# Patient Record
Sex: FEMALE | Race: WHITE | NOT HISPANIC OR LATINO | Employment: OTHER | URBAN - NONMETROPOLITAN AREA
[De-identification: names, ages, dates, MRNs, and addresses within clinical notes are randomized per-mention and may not be internally consistent; named-entity substitution may affect disease eponyms.]

---

## 2020-09-02 ENCOUNTER — HOSPITAL ENCOUNTER (OUTPATIENT)
Dept: GENERAL RADIOLOGY | Facility: HOSPITAL | Age: 75
Discharge: HOME | End: 2020-09-02
Admitting: FAMILY MEDICINE

## 2020-09-02 ENCOUNTER — TRANSCRIBE ORDERS (OUTPATIENT)
Dept: LAB | Facility: HOSPITAL | Age: 75
End: 2020-09-02

## 2020-09-02 DIAGNOSIS — M54.40 LOW BACK PAIN WITH SCIATICA, SCIATICA LATERALITY UNSPECIFIED, UNSPECIFIED BACK PAIN LATERALITY, UNSPECIFIED CHRONICITY: ICD-10-CM

## 2020-09-02 DIAGNOSIS — M54.40 LOW BACK PAIN WITH SCIATICA, SCIATICA LATERALITY UNSPECIFIED, UNSPECIFIED BACK PAIN LATERALITY, UNSPECIFIED CHRONICITY: Primary | ICD-10-CM

## 2020-09-02 PROCEDURE — 72110 X-RAY EXAM L-2 SPINE 4/>VWS: CPT | Performed by: RADIOLOGY

## 2020-09-02 PROCEDURE — 72110 X-RAY EXAM L-2 SPINE 4/>VWS: CPT

## 2020-10-23 ENCOUNTER — HOSPITAL ENCOUNTER (OUTPATIENT)
Dept: GENERAL RADIOLOGY | Facility: HOSPITAL | Age: 75
Discharge: HOME | End: 2020-10-23
Admitting: FAMILY MEDICINE

## 2020-10-23 DIAGNOSIS — R06.00 DYSPNEA: ICD-10-CM

## 2020-10-23 PROCEDURE — 71046 X-RAY EXAM CHEST 2 VIEWS: CPT | Performed by: RADIOLOGY

## 2020-10-23 PROCEDURE — 71046 X-RAY EXAM CHEST 2 VIEWS: CPT

## 2021-01-25 ENCOUNTER — IMMUNIZATION (OUTPATIENT)
Dept: VACCINE CLINIC | Facility: HOSPITAL | Age: 76
End: 2021-01-25

## 2021-01-25 PROCEDURE — 0001A: CPT | Performed by: FAMILY MEDICINE

## 2021-01-25 PROCEDURE — 91300 HC SARSCOV02 VAC 30MCG/0.3ML IM: CPT | Performed by: FAMILY MEDICINE

## 2021-02-15 ENCOUNTER — APPOINTMENT (OUTPATIENT)
Dept: VACCINE CLINIC | Facility: HOSPITAL | Age: 76
End: 2021-02-15

## 2021-02-19 ENCOUNTER — IMMUNIZATION (OUTPATIENT)
Dept: VACCINE CLINIC | Facility: HOSPITAL | Age: 76
End: 2021-02-19

## 2021-02-19 PROCEDURE — 0002A: CPT | Performed by: INTERNAL MEDICINE

## 2021-02-19 PROCEDURE — 91300 HC SARSCOV02 VAC 30MCG/0.3ML IM: CPT | Performed by: INTERNAL MEDICINE

## 2021-05-11 ENCOUNTER — HOSPITAL ENCOUNTER (OUTPATIENT)
Dept: GENERAL RADIOLOGY | Facility: HOSPITAL | Age: 76
Discharge: HOME | End: 2021-05-11
Admitting: NURSE PRACTITIONER

## 2021-05-11 ENCOUNTER — TRANSCRIBE ORDERS (OUTPATIENT)
Dept: GENERAL RADIOLOGY | Facility: HOSPITAL | Age: 76
End: 2021-05-11

## 2021-05-11 DIAGNOSIS — R05.9 COUGH: Primary | ICD-10-CM

## 2021-05-11 DIAGNOSIS — R05.9 COUGH: ICD-10-CM

## 2021-05-11 PROCEDURE — 71046 X-RAY EXAM CHEST 2 VIEWS: CPT

## 2021-05-11 PROCEDURE — 71046 X-RAY EXAM CHEST 2 VIEWS: CPT | Performed by: RADIOLOGY

## 2021-07-15 ENCOUNTER — HOSPITAL ENCOUNTER (OUTPATIENT)
Dept: GENERAL RADIOLOGY | Facility: HOSPITAL | Age: 76
Discharge: HOME | End: 2021-07-15
Admitting: FAMILY MEDICINE

## 2021-07-15 ENCOUNTER — TRANSCRIBE ORDERS (OUTPATIENT)
Dept: LAB | Facility: HOSPITAL | Age: 76
End: 2021-07-15

## 2021-07-15 DIAGNOSIS — J40 BRONCHITIS: ICD-10-CM

## 2021-07-15 DIAGNOSIS — J40 BRONCHITIS: Primary | ICD-10-CM

## 2021-07-15 PROCEDURE — 71046 X-RAY EXAM CHEST 2 VIEWS: CPT | Performed by: RADIOLOGY

## 2021-07-15 PROCEDURE — 71046 X-RAY EXAM CHEST 2 VIEWS: CPT

## 2022-11-01 ENCOUNTER — HOSPITAL ENCOUNTER (OUTPATIENT)
Dept: GENERAL RADIOLOGY | Facility: HOSPITAL | Age: 77
Discharge: HOME | End: 2022-11-01
Admitting: FAMILY MEDICINE

## 2022-11-01 ENCOUNTER — TRANSCRIBE ORDERS (OUTPATIENT)
Dept: LAB | Facility: HOSPITAL | Age: 77
End: 2022-11-01

## 2022-11-01 DIAGNOSIS — J40 BRONCHITIS, NOT SPECIFIED AS ACUTE OR CHRONIC: Primary | ICD-10-CM

## 2022-11-01 DIAGNOSIS — J40 BRONCHITIS, NOT SPECIFIED AS ACUTE OR CHRONIC: ICD-10-CM

## 2022-11-01 PROCEDURE — 71046 X-RAY EXAM CHEST 2 VIEWS: CPT | Performed by: RADIOLOGY

## 2022-11-01 PROCEDURE — 71046 X-RAY EXAM CHEST 2 VIEWS: CPT

## 2023-04-27 ENCOUNTER — TRANSCRIBE ORDERS (OUTPATIENT)
Dept: LAB | Facility: HOSPITAL | Age: 78
End: 2023-04-27
Payer: MEDICARE

## 2023-04-27 ENCOUNTER — HOSPITAL ENCOUNTER (OUTPATIENT)
Dept: GENERAL RADIOLOGY | Facility: HOSPITAL | Age: 78
Discharge: HOME | End: 2023-04-27
Payer: MEDICARE

## 2023-04-27 DIAGNOSIS — M47.9 OSTEOARTHRITIS OF SPINE, UNSPECIFIED SPINAL OSTEOARTHRITIS COMPLICATION STATUS, UNSPECIFIED SPINAL REGION: ICD-10-CM

## 2023-04-27 DIAGNOSIS — M47.9 OSTEOARTHRITIS OF SPINE, UNSPECIFIED SPINAL OSTEOARTHRITIS COMPLICATION STATUS, UNSPECIFIED SPINAL REGION: Primary | ICD-10-CM

## 2023-04-27 PROCEDURE — 72100 X-RAY EXAM L-S SPINE 2/3 VWS: CPT | Performed by: RADIOLOGY

## 2023-04-27 PROCEDURE — 72100 X-RAY EXAM L-S SPINE 2/3 VWS: CPT

## 2024-03-05 ENCOUNTER — HOSPITAL ENCOUNTER (OUTPATIENT)
Dept: GENERAL RADIOLOGY | Facility: HOSPITAL | Age: 79
Discharge: HOME | End: 2024-03-05
Admitting: FAMILY MEDICINE
Payer: MEDICARE

## 2024-03-05 DIAGNOSIS — J45.21 MILD INTERMITTENT ASTHMA WITH EXACERBATION: ICD-10-CM

## 2024-03-05 PROCEDURE — 71046 X-RAY EXAM CHEST 2 VIEWS: CPT

## 2024-03-05 PROCEDURE — 71046 X-RAY EXAM CHEST 2 VIEWS: CPT | Performed by: RADIOLOGY

## 2024-04-23 ENCOUNTER — OFFICE VISIT (OUTPATIENT)
Dept: CARDIOLOGY | Facility: CLINIC | Age: 79
End: 2024-04-23
Payer: MEDICARE

## 2024-04-23 VITALS
HEIGHT: 62 IN | DIASTOLIC BLOOD PRESSURE: 70 MMHG | OXYGEN SATURATION: 97 % | BODY MASS INDEX: 31.87 KG/M2 | WEIGHT: 173.2 LBS | HEART RATE: 70 BPM | SYSTOLIC BLOOD PRESSURE: 146 MMHG

## 2024-04-23 DIAGNOSIS — R93.1 ABNORMAL CARDIAC CT ANGIOGRAPHY: ICD-10-CM

## 2024-04-23 DIAGNOSIS — R93.1 ABNORMAL CARDIAC CT ANGIOGRAPHY: Primary | ICD-10-CM

## 2024-04-23 DIAGNOSIS — R07.9 CHRONIC CHEST PAIN WITH HIGH RISK FOR CAD: Primary | ICD-10-CM

## 2024-04-23 DIAGNOSIS — I10 ESSENTIAL HYPERTENSION: ICD-10-CM

## 2024-04-23 DIAGNOSIS — Z91.89 CHRONIC CHEST PAIN WITH HIGH RISK FOR CAD: Primary | ICD-10-CM

## 2024-04-23 DIAGNOSIS — G89.29 CHRONIC CHEST PAIN WITH HIGH RISK FOR CAD: Primary | ICD-10-CM

## 2024-04-23 PROCEDURE — 93000 ELECTROCARDIOGRAM COMPLETE: CPT | Performed by: INTERNAL MEDICINE

## 2024-04-23 PROCEDURE — 3077F SYST BP >= 140 MM HG: CPT | Performed by: INTERNAL MEDICINE

## 2024-04-23 PROCEDURE — 99204 OFFICE O/P NEW MOD 45 MIN: CPT | Performed by: INTERNAL MEDICINE

## 2024-04-23 PROCEDURE — 3078F DIAST BP <80 MM HG: CPT | Performed by: INTERNAL MEDICINE

## 2024-04-23 RX ORDER — HYDROCODONE BITARTRATE AND ACETAMINOPHEN 5; 325 MG/1; MG/1
1 TABLET ORAL 3 TIMES DAILY
COMMUNITY
Start: 2024-04-05 | End: 2024-06-04

## 2024-04-23 RX ORDER — HYDROCHLOROTHIAZIDE 12.5 MG/1
12.5 CAPSULE, GELATIN COATED ORAL DAILY
Qty: 90 CAPSULE | Refills: 3 | Status: SHIPPED | OUTPATIENT
Start: 2024-04-23

## 2024-04-23 RX ORDER — ALPRAZOLAM 0.5 MG/1
0.5 TABLET ORAL NIGHTLY PRN
COMMUNITY
Start: 2024-04-05 | End: 2024-10-02

## 2024-04-23 RX ORDER — LEVOTHYROXINE SODIUM 0.1 MG/1
100 TABLET ORAL DAILY
COMMUNITY
Start: 2023-08-16 | End: 2024-08-15

## 2024-04-23 RX ORDER — LOSARTAN POTASSIUM 100 MG/1
100 TABLET ORAL DAILY
COMMUNITY
Start: 2024-02-13

## 2024-04-23 NOTE — H&P (VIEW-ONLY)
Subjective     Chief Complaint   Patient presents with    Establish Care     ?abn CT heart    Hypertension     today    Chest Pain       History of Present Illness    Martha is a 78 years old white female patient of Dr. Plascencia.  She states that she had been having aching in her left arm and left anterior chest pain and substernal fullness.  She was sent to [facility] for CT heart coronary with IV contrast.  Patient was told that her calcium score was very high and she had significant coronary artery disease.  She was apparently scheduled for coronary angiography in [place] however she was sent here for second opinion.    Chest pain is moderate in intensity it has atypical features along with atypical features it is not particular related to exertion however she gets left arm pain with exertion and gets short of breath.    There is no prior history of coronary artery disease    Patient quit smoking around 20 years ago used to smoke 1 pack of cigarettes daily  Hypertension is not very well-controlled she is taking Coreg 12.5 twice daily and losartan 100 mg daily.  She does not wish to increase coreg.    Hypothyroidism on Synthroid replacement therapy.    Past Surgical History:   Procedure Laterality Date    COLONOSCOPY      HYSTERECTOMY       Family History   Problem Relation Age of Onset    No Known Problems Mother     No Known Problems Father      Past Medical History:   Diagnosis Date    Hypertension     Psoriasis        Patient Active Problem List   Diagnosis    Psoriasis    Tubular adenoma of colon    Heartburn    Precordial pain    Essential hypertension    SOB (shortness of breath)    Heart palpitations    Chronic chest pain with high risk for CAD    Abnormal cardiac CT angiography         Social History     Tobacco Use    Smoking status: Former     Current packs/day: 0.00     Types: Cigarettes     Quit date: 10/27/2001     Years since quittin.5    Smokeless tobacco: Never   Substance Use Topics  "   Alcohol use: No    Drug use: No         The following portions of the patient's history were reviewed and updated as appropriate: allergies, current medications, past family history, past medical history, past social history, past surgical history and problem list.    No Known Allergies      Current Outpatient Medications:     ALPRAZolam (XANAX) 0.5 MG tablet, Take 1 tablet by mouth At Night As Needed., Disp: , Rfl:     carvedilol (COREG) 12.5 MG tablet, Take 1 tablet by mouth 2 (Two) Times a Day., Disp: 60 tablet, Rfl: 6    HYDROcodone-acetaminophen (NORCO) 5-325 MG per tablet, Take 1 tablet by mouth 3 (Three) Times a Day., Disp: , Rfl:     levothyroxine (SYNTHROID, LEVOTHROID) 100 MCG tablet, Take 1 tablet by mouth Daily., Disp: , Rfl:     losartan (COZAAR) 100 MG tablet, Take 1 tablet by mouth Daily., Disp: , Rfl:     hydroCHLOROthiazide (MICROZIDE) 12.5 MG capsule, Take 1 capsule by mouth Daily., Disp: 90 capsule, Rfl: 3    Review of Systems   Constitutional: Negative.   HENT: Negative.  Negative for congestion.    Eyes: Negative.    Cardiovascular:  Positive for chest pain and dyspnea on exertion. Negative for cyanosis, irregular heartbeat, leg swelling, near-syncope, orthopnea, palpitations, paroxysmal nocturnal dyspnea and syncope.   Respiratory:  Positive for shortness of breath.    Hematologic/Lymphatic: Negative.    Musculoskeletal: Negative.    Gastrointestinal: Negative.    Neurological: Negative.  Negative for headaches.        Objective      /70 (BP Location: Left arm, Patient Position: Sitting, Cuff Size: Adult)   Pulse 70   Ht 157.5 cm (62\")   Wt 78.6 kg (173 lb 3.2 oz)   SpO2 97%   BMI 31.68 kg/m²     Pulmonary:      Effort: Pulmonary effort is normal.      Breath sounds: Normal breath sounds. No stridor. No wheezing. No rhonchi. No rales.   Cardiovascular:      PMI at left midclavicular line. Normal rate. Regular rhythm. Normal S1. Normal S2.       Murmurs: There is no murmur.      No " gallop.  No click. No rub.   Pulses:     Intact distal pulses.   Edema:     Peripheral edema absent.         Lab Review:    CBC Auto Diff, Reflex Manual Diff if Indicated    Component  Ref Range & Units 1 mo ago   WBC  4.12 - 11.13 10*3/uL 7.03   RBC  4.07 - 4.92 10*6/uL 3.84 Low    Hemoglobin  12.0 - 14.1 g/dL 10.9 Low    Hematocrit  33.5 - 43.3 % 35.6   MCV  77.2 - 97.5 fL 92.7   MCH  26.6 - 31.8 pg 28.4   MCHC  31.8 - 34.9 g/dL 30.6 Low    RDW  11.6 - 14.7 % 14.4   Platelet Count  150 - 350 10*3/uL 187   MPV  8.8 - 11.8 fL 11.1   Neutrophils %  32.3 - 66.7 % 66.5   Lymphocytes %  21.2 - 49.6 % 23.3   Monocytes %  2.0 - 12.0 % 5.3   Eosinophils %  1.0 - 6.0 % 3.8   Basophils %  0.1 - 1.1 % 0.7   Neutrophils Absolute  1.30 - 7.00 10*3/uL 4.67   Lymphocytes Absolute  0.90 - 5.50 10*3/uL 1.64   Monocytes Absolute  0.10 - 1.30 10*3/uL 0.37   Eosinophils Absolute  0.00 - 1.00 10*3/uL 0.27   Basophil Absolute  0.00 - 0.10 10*3/uL 0.05   Immature Granulocytes %  0.0 - 1.0 % 0.4   Immature Granulocytes Absolute   0.03       Contains abnormal data Comprehensive Metabolic Panel (CMP)  Order: [id number]  Component  Ref Range & Units 1 mo ago   Sodium  136 - 145 mmol/L 140   Potassium  3.5 - 5.1 mmol/L 4.6   Chloride  98 - 107 mmol/L 105   Carbon Dioxide  22.0 - 29.0 mmol/L 26.1   Anion Gap  5 - 15 mmol/L 9   BUN  8.0 - 23.0 mg/dL 19.0   Creatinine  0.50 - 0.90 mg/dL 0.83   BUN/Creatinine Ratio 22.9   Glucose  70 - 105 mg/dL 96   Calcium  8.8 - 10.2 mg/dL 8.8   AST   18   ALT   8   Alkaline Phosphatase  35 - 104 U/L 69   Protein, Total  6.6 - 8.7 g/dL 7.1   Albumin  3.50 - 5.20 g/dL 4.10   Globulin  2.3 - 3.5 g/dL 3.0   A/G Ratio  1.1 - 2.1 1.4   Bilirubin, Total   0.47   Osmolality Calc  275 - 295 mosm/kg 273 Low    eGFR   73.2   Comment: GFR calculated based on CKD-EPI 2021 C     Lipid Panel  Order: [id number]  Component  Ref Range & Units 1 mo ago   Triglycerides   89   Cholesterol, Total   166.00      HDL Cholesterol  45.00  - 65.00 mg/dL 55.30      LDL Cholesterol, Calc   92.9   Chol/HDL Ratio 3.0   LDL/HDL Ratio 1.7   VLDL, Calculated  mg/dL 18   Non-HDL Cholesterol  mg/dL 111         ECG 12 Lead    Date/Time: 4/23/2024 3:26 PM  Performed by: Concha Burris MD    Authorized by: Concha Burris MD  Comparison: compared with previous ECG from 10/27/2016  Comparison to previous ECG: Nonspecific ST changes are more pronounced today  Rhythm: sinus rhythm  Rate: normal  BPM: 70  Conduction: conduction normal  QRS axis: normal  Other findings: non-specific ST-T wave changes    Clinical impression: abnormal EKG              I reviewed the patient's new clinical results.  I personally viewed and interpreted the patient's EKG/lab data        Assessment:   Diagnosis Plan   1. Chronic chest pain with high risk for CAD  ECG 12 Lead      2. Abnormal cardiac CT angiography        3. Essential hypertension               Plan:  Martha is a 78 years old white female who is here for cardiac evaluation because of chest pain and left arm pain and abnormal cardiac CT angiogram.    Blood pressure is also elevated.  She was advised to Coreg 12.5 twice daily, hydrochlorothiazide 12.5 daily and losartan 100 mg daily.    CT angiogram was reviewed and discussed with the patient  Patient has right dominant system, left main estimated stenosis less than 25%  LAD dense calcific plaque in the proximal and mid segment maximal stenosis estimated 50 to 69%  Circumflex without definitive high-grade stenosis  Right coronary artery estimated maximal stenosis at least 50 to 69%.  Very high calcium score    Options were discussed with the patient  She wants to pursue coronary angiogram which will be arranged    Thank you for giving me the oppertunity to participate in your patient's cardiac care.    Sincerely,    BEATRIS Burris M.D. FACP FACC     No follow-ups on file.

## 2024-04-23 NOTE — PROGRESS NOTES
Subjective     Chief Complaint   Patient presents with    Establish Care     ?abn CT heart    Hypertension     today    Chest Pain       History of Present Illness    Martha is a 78 years old white female patient of Dr. Plascencia.  She states that she had been having aching in her left arm and left anterior chest pain and substernal fullness.  She was sent to [facility] for CT heart coronary with IV contrast.  Patient was told that her calcium score was very high and she had significant coronary artery disease.  She was apparently scheduled for coronary angiography in [place] however she was sent here for second opinion.    Chest pain is moderate in intensity it has atypical features along with atypical features it is not particular related to exertion however she gets left arm pain with exertion and gets short of breath.    There is no prior history of coronary artery disease    Patient quit smoking around 20 years ago used to smoke 1 pack of cigarettes daily  Hypertension is not very well-controlled she is taking Coreg 12.5 twice daily and losartan 100 mg daily.  She does not wish to increase coreg.    Hypothyroidism on Synthroid replacement therapy.    Past Surgical History:   Procedure Laterality Date    COLONOSCOPY      HYSTERECTOMY       Family History   Problem Relation Age of Onset    No Known Problems Mother     No Known Problems Father      Past Medical History:   Diagnosis Date    Hypertension     Psoriasis        Patient Active Problem List   Diagnosis    Psoriasis    Tubular adenoma of colon    Heartburn    Precordial pain    Essential hypertension    SOB (shortness of breath)    Heart palpitations    Chronic chest pain with high risk for CAD    Abnormal cardiac CT angiography         Social History     Tobacco Use    Smoking status: Former     Current packs/day: 0.00     Types: Cigarettes     Quit date: 10/27/2001     Years since quittin.5    Smokeless tobacco: Never   Substance Use Topics  "   Alcohol use: No    Drug use: No         The following portions of the patient's history were reviewed and updated as appropriate: allergies, current medications, past family history, past medical history, past social history, past surgical history and problem list.    No Known Allergies      Current Outpatient Medications:     ALPRAZolam (XANAX) 0.5 MG tablet, Take 1 tablet by mouth At Night As Needed., Disp: , Rfl:     carvedilol (COREG) 12.5 MG tablet, Take 1 tablet by mouth 2 (Two) Times a Day., Disp: 60 tablet, Rfl: 6    HYDROcodone-acetaminophen (NORCO) 5-325 MG per tablet, Take 1 tablet by mouth 3 (Three) Times a Day., Disp: , Rfl:     levothyroxine (SYNTHROID, LEVOTHROID) 100 MCG tablet, Take 1 tablet by mouth Daily., Disp: , Rfl:     losartan (COZAAR) 100 MG tablet, Take 1 tablet by mouth Daily., Disp: , Rfl:     hydroCHLOROthiazide (MICROZIDE) 12.5 MG capsule, Take 1 capsule by mouth Daily., Disp: 90 capsule, Rfl: 3    Review of Systems   Constitutional: Negative.   HENT: Negative.  Negative for congestion.    Eyes: Negative.    Cardiovascular:  Positive for chest pain and dyspnea on exertion. Negative for cyanosis, irregular heartbeat, leg swelling, near-syncope, orthopnea, palpitations, paroxysmal nocturnal dyspnea and syncope.   Respiratory:  Positive for shortness of breath.    Hematologic/Lymphatic: Negative.    Musculoskeletal: Negative.    Gastrointestinal: Negative.    Neurological: Negative.  Negative for headaches.        Objective      /70 (BP Location: Left arm, Patient Position: Sitting, Cuff Size: Adult)   Pulse 70   Ht 157.5 cm (62\")   Wt 78.6 kg (173 lb 3.2 oz)   SpO2 97%   BMI 31.68 kg/m²     Pulmonary:      Effort: Pulmonary effort is normal.      Breath sounds: Normal breath sounds. No stridor. No wheezing. No rhonchi. No rales.   Cardiovascular:      PMI at left midclavicular line. Normal rate. Regular rhythm. Normal S1. Normal S2.       Murmurs: There is no murmur.      No " gallop.  No click. No rub.   Pulses:     Intact distal pulses.   Edema:     Peripheral edema absent.         Lab Review:    CBC Auto Diff, Reflex Manual Diff if Indicated    Component  Ref Range & Units 1 mo ago   WBC  4.12 - 11.13 10*3/uL 7.03   RBC  4.07 - 4.92 10*6/uL 3.84 Low    Hemoglobin  12.0 - 14.1 g/dL 10.9 Low    Hematocrit  33.5 - 43.3 % 35.6   MCV  77.2 - 97.5 fL 92.7   MCH  26.6 - 31.8 pg 28.4   MCHC  31.8 - 34.9 g/dL 30.6 Low    RDW  11.6 - 14.7 % 14.4   Platelet Count  150 - 350 10*3/uL 187   MPV  8.8 - 11.8 fL 11.1   Neutrophils %  32.3 - 66.7 % 66.5   Lymphocytes %  21.2 - 49.6 % 23.3   Monocytes %  2.0 - 12.0 % 5.3   Eosinophils %  1.0 - 6.0 % 3.8   Basophils %  0.1 - 1.1 % 0.7   Neutrophils Absolute  1.30 - 7.00 10*3/uL 4.67   Lymphocytes Absolute  0.90 - 5.50 10*3/uL 1.64   Monocytes Absolute  0.10 - 1.30 10*3/uL 0.37   Eosinophils Absolute  0.00 - 1.00 10*3/uL 0.27   Basophil Absolute  0.00 - 0.10 10*3/uL 0.05   Immature Granulocytes %  0.0 - 1.0 % 0.4   Immature Granulocytes Absolute   0.03       Contains abnormal data Comprehensive Metabolic Panel (CMP)  Order: [id number]  Component  Ref Range & Units 1 mo ago   Sodium  136 - 145 mmol/L 140   Potassium  3.5 - 5.1 mmol/L 4.6   Chloride  98 - 107 mmol/L 105   Carbon Dioxide  22.0 - 29.0 mmol/L 26.1   Anion Gap  5 - 15 mmol/L 9   BUN  8.0 - 23.0 mg/dL 19.0   Creatinine  0.50 - 0.90 mg/dL 0.83   BUN/Creatinine Ratio 22.9   Glucose  70 - 105 mg/dL 96   Calcium  8.8 - 10.2 mg/dL 8.8   AST   18   ALT   8   Alkaline Phosphatase  35 - 104 U/L 69   Protein, Total  6.6 - 8.7 g/dL 7.1   Albumin  3.50 - 5.20 g/dL 4.10   Globulin  2.3 - 3.5 g/dL 3.0   A/G Ratio  1.1 - 2.1 1.4   Bilirubin, Total   0.47   Osmolality Calc  275 - 295 mosm/kg 273 Low    eGFR   73.2   Comment: GFR calculated based on CKD-EPI 2021 C     Lipid Panel  Order: [id number]  Component  Ref Range & Units 1 mo ago   Triglycerides   89   Cholesterol, Total   166.00      HDL Cholesterol  45.00  - 65.00 mg/dL 55.30      LDL Cholesterol, Calc   92.9   Chol/HDL Ratio 3.0   LDL/HDL Ratio 1.7   VLDL, Calculated  mg/dL 18   Non-HDL Cholesterol  mg/dL 111         ECG 12 Lead    Date/Time: 4/23/2024 3:26 PM  Performed by: Concha Burris MD    Authorized by: Concha Burris MD  Comparison: compared with previous ECG from 10/27/2016  Comparison to previous ECG: Nonspecific ST changes are more pronounced today  Rhythm: sinus rhythm  Rate: normal  BPM: 70  Conduction: conduction normal  QRS axis: normal  Other findings: non-specific ST-T wave changes    Clinical impression: abnormal EKG              I reviewed the patient's new clinical results.  I personally viewed and interpreted the patient's EKG/lab data        Assessment:   Diagnosis Plan   1. Chronic chest pain with high risk for CAD  ECG 12 Lead      2. Abnormal cardiac CT angiography        3. Essential hypertension               Plan:  Martha is a 78 years old white female who is here for cardiac evaluation because of chest pain and left arm pain and abnormal cardiac CT angiogram.    Blood pressure is also elevated.  She was advised to Coreg 12.5 twice daily, hydrochlorothiazide 12.5 daily and losartan 100 mg daily.    CT angiogram was reviewed and discussed with the patient  Patient has right dominant system, left main estimated stenosis less than 25%  LAD dense calcific plaque in the proximal and mid segment maximal stenosis estimated 50 to 69%  Circumflex without definitive high-grade stenosis  Right coronary artery estimated maximal stenosis at least 50 to 69%.  Very high calcium score    Options were discussed with the patient  She wants to pursue coronary angiogram which will be arranged    Thank you for giving me the oppertunity to participate in your patient's cardiac care.    Sincerely,    BEATRIS Burris M.D. FACP FACC     No follow-ups on file.

## 2024-04-23 NOTE — LETTER
April 23, 2024     Nafisa Plascencia MD  [address] [place], [place] [id number]    Patient: Martha Rod   YOB: 1945   Date of Visit: 4/23/2024       Dear Nafisa Plascencia MD,    Martha Rod was in my office today. Below are the relevant portions of my assessment and plan of care.           If you have questions, please do not hesitate to call me. I look forward to following Martha along with you.         Sincerely,        Concha Burris MD        CC: No Recipients

## 2024-04-23 NOTE — LETTER
April 23, 2024     Nafisa Plascencia MD  [address] [place], [place] [id number]    Patient: Martha Rod   YOB: 1945   Date of Visit: 4/23/2024       Dear Nafisa Plascencia MD    Martha Rod was in my office today. Below is a copy of my note.    If you have questions, please do not hesitate to call me. I look forward to following Martha along with you.         Sincerely,        Concha Burris MD        CC: No Recipients    No notes on file

## 2024-04-25 ENCOUNTER — PATIENT ROUNDING (BHMG ONLY) (OUTPATIENT)
Dept: CARDIOLOGY | Facility: CLINIC | Age: 79
End: 2024-04-25
Payer: MEDICARE

## 2024-04-25 NOTE — PROGRESS NOTES
Migel. My name is Lu. I am a CMA at [facility] [place] [facility]      I want to officially welcome you to our practice and ask about your recent visit.         What things do you feel went well with your visit?        Do you have recommendations on ways we may improve?        Overall were you satisfied with your first visit to our practice?        I appreciate you taking the time to answer a few questions today.         We're always looking for ways to make our patients' experiences even better. Please complete the [facility] Survey after your visits. We would love to receive feedback about your visits. You may also share any suggestions or concerns by replying to this message or calling our office.         Thank you for allowing us to participate in your healthcare. Please let me know if I can be of further assistance.                Kind regards,      Lu

## 2024-05-01 DIAGNOSIS — R93.1 ABNORMAL CARDIAC CT ANGIOGRAPHY: Primary | ICD-10-CM

## 2024-05-03 ENCOUNTER — TRANSCRIBE ORDERS (OUTPATIENT)
Dept: CARDIAC REHAB | Facility: HOSPITAL | Age: 79
End: 2024-05-03
Payer: MEDICARE

## 2024-05-03 ENCOUNTER — HOSPITAL ENCOUNTER (OUTPATIENT)
Facility: HOSPITAL | Age: 79
Discharge: HOME | End: 2024-05-04
Attending: INTERNAL MEDICINE | Admitting: INTERNAL MEDICINE
Payer: MEDICARE

## 2024-05-03 DIAGNOSIS — Z91.89 CHRONIC CHEST PAIN WITH HIGH RISK FOR CAD: Primary | ICD-10-CM

## 2024-05-03 DIAGNOSIS — G89.29 CHRONIC CHEST PAIN WITH HIGH RISK FOR CAD: Primary | ICD-10-CM

## 2024-05-03 DIAGNOSIS — R07.9 CHRONIC CHEST PAIN WITH HIGH RISK FOR CAD: Primary | ICD-10-CM

## 2024-05-03 DIAGNOSIS — Z95.5 STENTED CORONARY ARTERY: Primary | ICD-10-CM

## 2024-05-03 DIAGNOSIS — R93.1 ABNORMAL CARDIAC CT ANGIOGRAPHY: ICD-10-CM

## 2024-05-03 PROBLEM — I25.10 CAD (CORONARY ARTERY DISEASE): Status: ACTIVE | Noted: 2024-05-03

## 2024-05-03 LAB
ACT BLD: 212 SECONDS (ref 82–152)
ACT BLD: 228 SECONDS (ref 82–152)
ALBUMIN SERPL-MCNC: 4.3 G/DL (ref 3.5–5.2)
ALBUMIN/GLOB SERPL: 1.9 G/DL
ALP SERPL-CCNC: 72 U/L (ref 39–117)
ALT SERPL W P-5'-P-CCNC: 7 U/L (ref 1–33)
ANION GAP SERPL CALCULATED.3IONS-SCNC: 7 MMOL/L (ref 5–15)
AST SERPL-CCNC: 11 U/L (ref 1–32)
BILIRUB SERPL-MCNC: 0.6 MG/DL (ref 0–1.2)
BUN SERPL-MCNC: 16 MG/DL (ref 8–23)
BUN/CREAT SERPL: 18.2 (ref 7–25)
CALCIUM SPEC-SCNC: 9.5 MG/DL (ref 8.6–10.5)
CATH EF ESTIMATED: 70 %
CHLORIDE SERPL-SCNC: 103 MMOL/L (ref 98–107)
CO2 SERPL-SCNC: 29 MMOL/L (ref 22–29)
CREAT SERPL-MCNC: 0.88 MG/DL (ref 0.57–1)
DEPRECATED RDW RBC AUTO: 49.1 FL (ref 37–54)
EGFRCR SERPLBLD CKD-EPI 2021: 68.2 ML/MIN/1.73
ERYTHROCYTE [DISTWIDTH] IN BLOOD BY AUTOMATED COUNT: 14.5 % (ref 12.3–15.4)
GLOBULIN UR ELPH-MCNC: 2.3 GM/DL
GLUCOSE SERPL-MCNC: 101 MG/DL (ref 65–99)
HBA1C MFR BLD: 4.6 % (ref 4.8–5.6)
HCT VFR BLD AUTO: 35.6 % (ref 34–46.6)
HGB BLD-MCNC: 10.9 G/DL (ref 12–15.9)
MCH RBC QN AUTO: 28.4 PG (ref 26.6–33)
MCHC RBC AUTO-ENTMCNC: 30.6 G/DL (ref 31.5–35.7)
MCV RBC AUTO: 92.7 FL (ref 79–97)
PLATELET # BLD AUTO: 143 10*3/MM3 (ref 140–450)
PMV BLD AUTO: 10.4 FL (ref 6–12)
POTASSIUM SERPL-SCNC: 4.5 MMOL/L (ref 3.5–5.2)
PROT SERPL-MCNC: 6.6 G/DL (ref 6–8.5)
RBC # BLD AUTO: 3.84 10*6/MM3 (ref 3.77–5.28)
SODIUM SERPL-SCNC: 139 MMOL/L (ref 136–145)
WBC NRBC COR # BLD AUTO: 5.09 10*3/MM3 (ref 3.4–10.8)

## 2024-05-03 PROCEDURE — 83036 HEMOGLOBIN GLYCOSYLATED A1C: CPT | Performed by: NURSE PRACTITIONER

## 2024-05-03 PROCEDURE — 85027 COMPLETE CBC AUTOMATED: CPT | Performed by: NURSE PRACTITIONER

## 2024-05-03 PROCEDURE — 93571 IV DOP VEL&/PRESS C FLO 1ST: CPT | Performed by: INTERNAL MEDICINE

## 2024-05-03 PROCEDURE — 85347 COAGULATION TIME ACTIVATED: CPT

## 2024-05-03 PROCEDURE — 63710000001 LEVOTHYROXINE 100 MCG TABLET: Performed by: PHYSICIAN ASSISTANT

## 2024-05-03 PROCEDURE — 25010000002 FENTANYL CITRATE (PF) 50 MCG/ML SOLUTION: Performed by: INTERNAL MEDICINE

## 2024-05-03 PROCEDURE — 93458 L HRT ARTERY/VENTRICLE ANGIO: CPT | Performed by: INTERNAL MEDICINE

## 2024-05-03 PROCEDURE — 63710000001 HYDROCODONE-ACETAMINOPHEN 5-325 MG TABLET: Performed by: INTERNAL MEDICINE

## 2024-05-03 PROCEDURE — G0378 HOSPITAL OBSERVATION PER HR: HCPCS

## 2024-05-03 PROCEDURE — 25010000002 NICARDIPINE 2.5 MG/ML SOLUTION: Performed by: INTERNAL MEDICINE

## 2024-05-03 PROCEDURE — C1887 CATHETER, GUIDING: HCPCS | Performed by: INTERNAL MEDICINE

## 2024-05-03 PROCEDURE — 93799 UNLISTED CV SVC/PROCEDURE: CPT | Performed by: INTERNAL MEDICINE

## 2024-05-03 PROCEDURE — C1874 STENT, COATED/COV W/DEL SYS: HCPCS | Performed by: INTERNAL MEDICINE

## 2024-05-03 PROCEDURE — C1894 INTRO/SHEATH, NON-LASER: HCPCS | Performed by: INTERNAL MEDICINE

## 2024-05-03 PROCEDURE — 25010000002 MIDAZOLAM PER 1 MG: Performed by: INTERNAL MEDICINE

## 2024-05-03 PROCEDURE — 25010000002 HEPARIN (PORCINE) PER 1000 UNITS: Performed by: INTERNAL MEDICINE

## 2024-05-03 PROCEDURE — C9600 PERC DRUG-EL COR STENT SING: HCPCS | Performed by: INTERNAL MEDICINE

## 2024-05-03 PROCEDURE — 25510000001 IOPAMIDOL PER 1 ML: Performed by: INTERNAL MEDICINE

## 2024-05-03 PROCEDURE — 25810000003 SODIUM CHLORIDE 0.9 % SOLUTION: Performed by: INTERNAL MEDICINE

## 2024-05-03 PROCEDURE — 25010000002 PROTAMINE SULFATE PER 10 MG: Performed by: INTERNAL MEDICINE

## 2024-05-03 PROCEDURE — 63710000001 CARVEDILOL 12.5 MG TABLET: Performed by: PHYSICIAN ASSISTANT

## 2024-05-03 PROCEDURE — 25810000003 SODIUM CHLORIDE 0.9 % SOLUTION: Performed by: NURSE PRACTITIONER

## 2024-05-03 PROCEDURE — C1769 GUIDE WIRE: HCPCS | Performed by: INTERNAL MEDICINE

## 2024-05-03 PROCEDURE — 80053 COMPREHEN METABOLIC PANEL: CPT | Performed by: NURSE PRACTITIONER

## 2024-05-03 PROCEDURE — A9270 NON-COVERED ITEM OR SERVICE: HCPCS | Performed by: INTERNAL MEDICINE

## 2024-05-03 PROCEDURE — 25010000002 MORPHINE PER 10 MG: Performed by: INTERNAL MEDICINE

## 2024-05-03 PROCEDURE — A9270 NON-COVERED ITEM OR SERVICE: HCPCS | Performed by: PHYSICIAN ASSISTANT

## 2024-05-03 PROCEDURE — C1725 CATH, TRANSLUMIN NON-LASER: HCPCS | Performed by: INTERNAL MEDICINE

## 2024-05-03 PROCEDURE — 92928 PRQ TCAT PLMT NTRAC ST 1 LES: CPT | Performed by: INTERNAL MEDICINE

## 2024-05-03 DEVICE — XIENCE SKYPOINT™ EVEROLIMUS ELUTING CORONARY STENT SYSTEM 3.25 MM X 18 MM / RAPID-EXCHANGE
Type: IMPLANTABLE DEVICE | Site: CORONARY | Status: FUNCTIONAL
Brand: XIENCE SKYPOINT™

## 2024-05-03 RX ORDER — MORPHINE SULFATE 2 MG/ML
2 INJECTION, SOLUTION INTRAMUSCULAR; INTRAVENOUS EVERY 4 HOURS PRN
Status: DISCONTINUED | OUTPATIENT
Start: 2024-05-03 | End: 2024-05-04 | Stop reason: HOSPADM

## 2024-05-03 RX ORDER — HYDROCODONE BITARTRATE AND ACETAMINOPHEN 5; 325 MG/1; MG/1
1 TABLET ORAL EVERY 4 HOURS PRN
Status: DISCONTINUED | OUTPATIENT
Start: 2024-05-03 | End: 2024-05-04 | Stop reason: HOSPADM

## 2024-05-03 RX ORDER — ASPIRIN 325 MG
325 TABLET ORAL ONCE
Status: COMPLETED | OUTPATIENT
Start: 2024-05-03 | End: 2024-05-03

## 2024-05-03 RX ORDER — ASPIRIN 325 MG
325 TABLET, DELAYED RELEASE (ENTERIC COATED) ORAL DAILY
Status: DISCONTINUED | OUTPATIENT
Start: 2024-05-04 | End: 2024-05-03 | Stop reason: HOSPADM

## 2024-05-03 RX ORDER — SODIUM CHLORIDE 9 MG/ML
40 INJECTION, SOLUTION INTRAVENOUS AS NEEDED
Status: DISCONTINUED | OUTPATIENT
Start: 2024-05-03 | End: 2024-05-03 | Stop reason: HOSPADM

## 2024-05-03 RX ORDER — CLOPIDOGREL BISULFATE 75 MG/1
TABLET ORAL
Status: DISCONTINUED | OUTPATIENT
Start: 2024-05-03 | End: 2024-05-03 | Stop reason: HOSPADM

## 2024-05-03 RX ORDER — CARVEDILOL 12.5 MG/1
12.5 TABLET ORAL 2 TIMES DAILY WITH MEALS
Status: DISCONTINUED | OUTPATIENT
Start: 2024-05-03 | End: 2024-05-04 | Stop reason: HOSPADM

## 2024-05-03 RX ORDER — ASPIRIN 81 MG/1
81 TABLET, CHEWABLE ORAL DAILY
Start: 2024-05-03

## 2024-05-03 RX ORDER — LEVOTHYROXINE SODIUM 0.1 MG/1
100 TABLET ORAL DAILY
Status: DISCONTINUED | OUTPATIENT
Start: 2024-05-03 | End: 2024-05-04 | Stop reason: HOSPADM

## 2024-05-03 RX ORDER — BISACODYL 10 MG
10 SUPPOSITORY, RECTAL RECTAL DAILY PRN
Status: DISCONTINUED | OUTPATIENT
Start: 2024-05-03 | End: 2024-05-04 | Stop reason: HOSPADM

## 2024-05-03 RX ORDER — FENTANYL CITRATE 50 UG/ML
INJECTION, SOLUTION INTRAMUSCULAR; INTRAVENOUS
Status: DISCONTINUED | OUTPATIENT
Start: 2024-05-03 | End: 2024-05-03 | Stop reason: HOSPADM

## 2024-05-03 RX ORDER — SODIUM CHLORIDE 0.9 % (FLUSH) 0.9 %
1-10 SYRINGE (ML) INJECTION AS NEEDED
Status: DISCONTINUED | OUTPATIENT
Start: 2024-05-03 | End: 2024-05-03 | Stop reason: HOSPADM

## 2024-05-03 RX ORDER — ONDANSETRON 2 MG/ML
4 INJECTION INTRAMUSCULAR; INTRAVENOUS EVERY 6 HOURS PRN
Status: DISCONTINUED | OUTPATIENT
Start: 2024-05-03 | End: 2024-05-04 | Stop reason: HOSPADM

## 2024-05-03 RX ORDER — ACETAMINOPHEN 325 MG/1
650 TABLET ORAL EVERY 4 HOURS PRN
Status: DISCONTINUED | OUTPATIENT
Start: 2024-05-03 | End: 2024-05-04 | Stop reason: HOSPADM

## 2024-05-03 RX ORDER — ONDANSETRON 2 MG/ML
4 INJECTION INTRAMUSCULAR; INTRAVENOUS EVERY 6 HOURS PRN
Status: DISCONTINUED | OUTPATIENT
Start: 2024-05-03 | End: 2024-05-03 | Stop reason: HOSPADM

## 2024-05-03 RX ORDER — NICARDIPINE HCL-0.9% SOD CHLOR 1 MG/10 ML
SYRINGE (ML) INTRAVENOUS
Status: DISCONTINUED | OUTPATIENT
Start: 2024-05-03 | End: 2024-05-03 | Stop reason: HOSPADM

## 2024-05-03 RX ORDER — CLOPIDOGREL BISULFATE 75 MG/1
75 TABLET ORAL DAILY
Qty: 30 TABLET | Refills: 11 | Status: SHIPPED | OUTPATIENT
Start: 2024-05-03

## 2024-05-03 RX ORDER — MORPHINE SULFATE 2 MG/ML
2 INJECTION, SOLUTION INTRAMUSCULAR; INTRAVENOUS ONCE
Status: COMPLETED | OUTPATIENT
Start: 2024-05-03 | End: 2024-05-03

## 2024-05-03 RX ORDER — LIDOCAINE HYDROCHLORIDE 10 MG/ML
INJECTION, SOLUTION EPIDURAL; INFILTRATION; INTRACAUDAL; PERINEURAL
Status: DISCONTINUED | OUTPATIENT
Start: 2024-05-03 | End: 2024-05-03 | Stop reason: HOSPADM

## 2024-05-03 RX ORDER — ROSUVASTATIN CALCIUM 20 MG/1
20 TABLET, COATED ORAL DAILY
Qty: 90 TABLET | Refills: 4 | Status: SHIPPED | OUTPATIENT
Start: 2024-05-03

## 2024-05-03 RX ORDER — NITROGLYCERIN 0.4 MG/1
0.4 TABLET SUBLINGUAL
Status: DISCONTINUED | OUTPATIENT
Start: 2024-05-03 | End: 2024-05-03 | Stop reason: HOSPADM

## 2024-05-03 RX ORDER — SODIUM CHLORIDE 0.9 % (FLUSH) 0.9 %
10 SYRINGE (ML) INJECTION EVERY 12 HOURS SCHEDULED
Status: DISCONTINUED | OUTPATIENT
Start: 2024-05-03 | End: 2024-05-03 | Stop reason: HOSPADM

## 2024-05-03 RX ORDER — ONDANSETRON 4 MG/1
4 TABLET, ORALLY DISINTEGRATING ORAL EVERY 6 HOURS PRN
Status: DISCONTINUED | OUTPATIENT
Start: 2024-05-03 | End: 2024-05-04 | Stop reason: HOSPADM

## 2024-05-03 RX ORDER — MIDAZOLAM HYDROCHLORIDE 1 MG/ML
INJECTION INTRAMUSCULAR; INTRAVENOUS
Status: DISCONTINUED | OUTPATIENT
Start: 2024-05-03 | End: 2024-05-03 | Stop reason: HOSPADM

## 2024-05-03 RX ORDER — LOSARTAN POTASSIUM 50 MG/1
100 TABLET ORAL DAILY
Status: DISCONTINUED | OUTPATIENT
Start: 2024-05-04 | End: 2024-05-04 | Stop reason: HOSPADM

## 2024-05-03 RX ORDER — BISACODYL 5 MG/1
5 TABLET, DELAYED RELEASE ORAL DAILY PRN
Status: DISCONTINUED | OUTPATIENT
Start: 2024-05-03 | End: 2024-05-04 | Stop reason: HOSPADM

## 2024-05-03 RX ORDER — POLYETHYLENE GLYCOL 3350 17 G/17G
17 POWDER, FOR SOLUTION ORAL DAILY PRN
Status: DISCONTINUED | OUTPATIENT
Start: 2024-05-03 | End: 2024-05-04 | Stop reason: HOSPADM

## 2024-05-03 RX ORDER — ACETAMINOPHEN 325 MG/1
650 TABLET ORAL EVERY 4 HOURS PRN
Status: DISCONTINUED | OUTPATIENT
Start: 2024-05-03 | End: 2024-05-03

## 2024-05-03 RX ORDER — PROTAMINE SULFATE 10 MG/ML
30 INJECTION, SOLUTION INTRAVENOUS ONCE
Status: COMPLETED | OUTPATIENT
Start: 2024-05-03 | End: 2024-05-03

## 2024-05-03 RX ORDER — AMOXICILLIN 250 MG
2 CAPSULE ORAL 2 TIMES DAILY PRN
Status: DISCONTINUED | OUTPATIENT
Start: 2024-05-03 | End: 2024-05-04 | Stop reason: HOSPADM

## 2024-05-03 RX ORDER — HEPARIN SODIUM 1000 [USP'U]/ML
INJECTION, SOLUTION INTRAVENOUS; SUBCUTANEOUS
Status: DISCONTINUED | OUTPATIENT
Start: 2024-05-03 | End: 2024-05-03 | Stop reason: HOSPADM

## 2024-05-03 RX ADMIN — HYDROCODONE BITARTRATE AND ACETAMINOPHEN 1 TABLET: 5; 325 TABLET ORAL at 16:48

## 2024-05-03 RX ADMIN — PROTAMINE SULFATE 30 MG: 10 INJECTION, SOLUTION INTRAVENOUS at 12:01

## 2024-05-03 RX ADMIN — MORPHINE SULFATE 2 MG: 2 INJECTION, SOLUTION INTRAMUSCULAR; INTRAVENOUS at 12:28

## 2024-05-03 RX ADMIN — SODIUM CHLORIDE 235.8 ML: 9 INJECTION, SOLUTION INTRAVENOUS at 06:43

## 2024-05-03 RX ADMIN — CARVEDILOL 12.5 MG: 12.5 TABLET, FILM COATED ORAL at 18:22

## 2024-05-03 RX ADMIN — ASPIRIN 325 MG: 325 TABLET ORAL at 06:49

## 2024-05-03 RX ADMIN — MORPHINE SULFATE 2 MG: 2 INJECTION, SOLUTION INTRAMUSCULAR; INTRAVENOUS at 11:04

## 2024-05-03 RX ADMIN — LEVOTHYROXINE SODIUM 100 MCG: 0.1 TABLET ORAL at 16:48

## 2024-05-03 NOTE — Clinical Note
Hemostasis started on the right radial artery. R-Band was used in achieving hemostasis. Radial compression device applied to vessel. Hemostasis achieved successfully. Closure device additional comment: 12 cc

## 2024-05-03 NOTE — PROGRESS NOTES
Pt. Referred for Phase II Cardiac Rehab. Staff discussed benefits of exercise, program protocol, and educational material provided. Teach back verified.  Permission granted from patient for staff to fax referral information to outlying program at this time.  Staff [facility]ed referral info to [facility].

## 2024-05-03 NOTE — NURSING NOTE
Chief Complaint   Patient presents with    Well Child     18 year          Well Adolescent Juan Briggs is a 18 y.o. female presenting for this well adolescent and/or school/sports physical.   She is seen today accompanied by mother. Interval Concerns: none    Diet: varied well balanced    Sleep :  Appropriate for age    Development and School: 11th grade     Social: unchanged    Screening: Vision/Hearing checked x   Visual Acuity Screening    Right eye Left eye Both eyes   Without correction:      With correction: 20/25 20/25 20/25          Blood Pressure checked x    Mental/emotional health reviewed x         Hgb/Hct (menstruating) x       Sees Dentist?: yes       Sees Orthodontist?:  no       Glasses or contacts?:  yes       TB screening questions negative?:  yes       Dyslipidemia risk assessed?:  yes                 Review of Systems  A comprehensive review of systems was negative except for that written in the HPI. Objective:       Visit Vitals  /69   Pulse 83   Temp 98.2 °F (36.8 °C) (Oral)   Resp 24   Ht 5' 4.53\" (1.639 m)   Wt 138 lb 6.4 oz (62.8 kg)   LMP 09/16/2019   SpO2 99%   BMI 23.37 kg/m²       General appearance  alert, cooperative, no distress, appears stated age   Head  Normocephalic, without obvious abnormality, atraumatic   Eyes  conjunctivae/corneas clear. PERRL, EOM's intact. Wears glasses   Ears  normal TM's and external ear canals AU   Nose Nares normal.     Throat Lips, mucosa, and tongue normal. Teeth and gums normal   Neck supple, symmetrical, trachea midline, no adenopathy, thyroid: not enlarged, symmetric, no tenderness/mass/nodules, no carotid bruit and no JVD   Back   symmetric, no curvature. ROM normal. No CVA tenderness   Lungs   clear to auscultation bilaterally        Heart  regular rate and rhythm, S1, S2 normal, no murmur, click, rub or gallop   Abdomen   soft, non-tender.  Bowel sounds normal. No masses,  No organomegaly   Pelvic Deferred Right arm swollen/purple but soft. Propped on pillow with ice pack on time. VS stable, RA, NSR. oxycodone given for patient pain. No concerns at this time.    Extremities extremities normal, atraumatic, no cyanosis or edema   Pulses 2+ and symmetric   Skin   No rashes or lesions   Lymph nodes Cervical, supraclavicular, and axillary nodes normal.   Neurologic Normal         3 most recent PHQ Screens 9/30/2019   Little interest or pleasure in doing things Not at all   Feeling down, depressed, irritable, or hopeless Not at all   Total Score PHQ 2 0   In the past year have you felt depressed or sad most days, even if you felt okay? No   Has there been a time in the past month when you have had serious thoughts about ending your life? No   Have you ever in your whole life, tried to kill yourself or made a suicide attempt? -       Assessment:    ICD-10-CM ICD-9-CM    1. Encounter for routine child health examination without abnormal findings Z00.129 V20.2    2. Encounter for vision screening Z01.00 V72.0 AMB POC VISUAL ACUITY SCREEN   3. Wears glasses Z97.3 V49.89    4. BMI (body mass index), pediatric, 5% to less than 85% for age Z76.54 V80.46    5. Encounter for immunization Z23 V03.89    6. PCOS (polycystic ovarian syndrome) E28.2 256.4    7. Allergic rhinitis due to other allergic trigger, unspecified seasonality J30.89 477.8 fluticasone propionate (FLONASE) 50 mcg/actuation nasal spray      loratadine (CLARITIN) 10 mg tablet   8. Depression screen Z13.31 V79.0 BEHAV ASSMT W/SCORE & DOCD/STAND INSTRUMENT       1/2/3/4/5: Healthy 18 y.o. old female with no physical activity limitations. Vision screen completed, wears glasses, checked regularly   Due for flu vaccine, pt deferred today  The patient and mother were counseled regarding nutrition and physical activity. 6. Follows with endocrinology, stable on metformin, has f/u scheduled for 2/20  Will get rest of labs today    7. Refills for allergy medication given    8.  Depression screen filled out, reviewed, no concerns today    Plan and evaluation (above) reviewed with pt/parent(s) at visit  Parent(s) voiced understanding of plan and provided with time to ask/review questions. After Visit Summary (AVS) provided to pt/parent(s) after visit with additional instructions as needed/reviewed. Plan:  Anticipatory Guidance: Gave a handout on well teen issues at this age , importance of varied diet, minimize junk food, importance of regular dental care, seat belts/ sports protective gear/ helmet safety/ swimming safety, healthy sexual awareness/ relationships, reviewed tobacco, alcohol and drug dangers    Follow-up and Dispositions    · Return in about 1 year (around 9/30/2020) for 27year old well check, sooner as needed.        lab results and schedule of future lab studies reviewed with patient   reviewed medications and side effects in detail  Reviewed diet, exercise and weight control   cardiovascular risk and specific lipid/LDL goals reviewed         Jayashree Carr DO

## 2024-05-03 NOTE — INTERVAL H&P NOTE
"  H&P reviewed. The patient was examined and there are no changes to the H&P.      78-year-old female with chest pain and calcium score of 984 on recent CTA chest, showing estimated 50-69% stenoses in the LAD and RCA territories, who presents today for left heart catheterization.    BP (!) 181/79 (BP Location: Left arm, Patient Position: Lying)   Pulse 71   Temp 97.6 °F (36.4 °C) (Tympanic)   Resp 16   Ht 157.5 cm (62\")   Wt 78.2 kg (172 lb 6.4 oz)   SpO2 98%   BMI 31.53 kg/m²     Plan:     If labs acceptable, proceed with LHC +/- CBI via right radial approach.  Procedure, risks, and benefits discussed and patient wishes to proceed.   Patient will be premedicated with aspirin 324 mg prior to procedure.   Further recommendations to follow procedure.     FRANKLIN Nation        "

## 2024-05-04 VITALS
HEIGHT: 62 IN | OXYGEN SATURATION: 97 % | RESPIRATION RATE: 18 BRPM | SYSTOLIC BLOOD PRESSURE: 149 MMHG | TEMPERATURE: 97.8 F | DIASTOLIC BLOOD PRESSURE: 71 MMHG | WEIGHT: 172.4 LBS | BODY MASS INDEX: 31.73 KG/M2 | HEART RATE: 71 BPM

## 2024-05-04 LAB
ANION GAP SERPL CALCULATED.3IONS-SCNC: 10 MMOL/L (ref 5–15)
BUN SERPL-MCNC: 19 MG/DL (ref 8–23)
BUN/CREAT SERPL: 25 (ref 7–25)
CALCIUM SPEC-SCNC: 8.9 MG/DL (ref 8.6–10.5)
CHLORIDE SERPL-SCNC: 103 MMOL/L (ref 98–107)
CO2 SERPL-SCNC: 25 MMOL/L (ref 22–29)
CREAT SERPL-MCNC: 0.76 MG/DL (ref 0.57–1)
EGFRCR SERPLBLD CKD-EPI 2021: 81.3 ML/MIN/1.73
GLUCOSE SERPL-MCNC: 98 MG/DL (ref 65–99)
POTASSIUM SERPL-SCNC: 4.3 MMOL/L (ref 3.5–5.2)
SODIUM SERPL-SCNC: 138 MMOL/L (ref 136–145)

## 2024-05-04 PROCEDURE — A9270 NON-COVERED ITEM OR SERVICE: HCPCS | Performed by: PHYSICIAN ASSISTANT

## 2024-05-04 PROCEDURE — G0378 HOSPITAL OBSERVATION PER HR: HCPCS

## 2024-05-04 PROCEDURE — 63710000001 CARVEDILOL 12.5 MG TABLET: Performed by: PHYSICIAN ASSISTANT

## 2024-05-04 PROCEDURE — 80048 BASIC METABOLIC PNL TOTAL CA: CPT | Performed by: INTERNAL MEDICINE

## 2024-05-04 PROCEDURE — 63710000001 LEVOTHYROXINE 100 MCG TABLET: Performed by: PHYSICIAN ASSISTANT

## 2024-05-04 PROCEDURE — 99213 OFFICE O/P EST LOW 20 MIN: CPT | Performed by: STUDENT IN AN ORGANIZED HEALTH CARE EDUCATION/TRAINING PROGRAM

## 2024-05-04 PROCEDURE — 63710000001 LOSARTAN 50 MG TABLET: Performed by: PHYSICIAN ASSISTANT

## 2024-05-04 RX ADMIN — CARVEDILOL 12.5 MG: 12.5 TABLET, FILM COATED ORAL at 09:02

## 2024-05-04 RX ADMIN — LEVOTHYROXINE SODIUM 100 MCG: 0.1 TABLET ORAL at 05:55

## 2024-05-04 RX ADMIN — LOSARTAN POTASSIUM 100 MG: 50 TABLET, FILM COATED ORAL at 09:02

## 2024-05-04 NOTE — DISCHARGE SUMMARY
Cardiac Electrophysiology Discharge Summary         [facility]        Discharge Summary       PATIENT NAME:  Martha Rod    :  1945 AGE: 78 y.o.     Admission Date: 5/3/2024   Discharge Date:      Reason for Admission:     Abnormal cardiac CT angiography    CAD (coronary artery disease)      Procedures     Left heart catheterization with PCI by Dr. Palma    Brief Summary of Hospital Course    Patient presented for left heart cath due to abnormal stress test and coronary CTA.  She was found to have a 85% proximal LAD lesion status post PCI.  Initially scheduled to go home yesterday but TR band came off and she had bruising and bleeding at her wrist site.  Dressing was placed overnight and she has had no further bleeding.  Does have significant bruising on her right forearm as well as some swelling in her hand.  Still has good movement of all extremities and no numbness.  Patient already has Plavix in hand for discharge.  Will  aspirin at the [facility] pharmacy over-the-counter.  Discussed monitoring for worsening swelling over her right wrist site as well as worsening swelling in the hand, numbness, difficulty with moving.  Will follow-up with Dr. Palma.    Consulting Providers:     Discharge Instructions     Final discharge disposition not confirmed    Activity:  No restriction.  Diet:  general  Follow-up  To office in: in the next few weeks       Discharge Medications          Discharge Medications        New Medications        Instructions Start Date   aspirin 81 MG chewable tablet   81 mg, Oral, Daily      clopidogrel 75 MG tablet  Commonly known as: PLAVIX   75 mg, Oral, Daily      rosuvastatin 20 MG tablet  Commonly known as: CRESTOR   20 mg, Oral, Daily             Continue These Medications        Instructions Start Date   ALPRAZolam 0.5 MG tablet  Commonly known as: XANAX   0.5 mg, Oral, As Needed      carvedilol 12.5 MG tablet  Commonly known as:  "COREG   12.5 mg, Oral, 2 Times Daily      hydroCHLOROthiazide 12.5 MG capsule  Commonly known as: MICROZIDE   12.5 mg, Oral, Daily      HYDROcodone-acetaminophen 5-325 MG per tablet  Commonly known as: NORCO   1 tablet, Oral, 3 Times Daily PRN      levothyroxine 100 MCG tablet  Commonly known as: SYNTHROID, LEVOTHROID   100 mcg, Oral, Daily      losartan 100 MG tablet  Commonly known as: COZAAR   100 mg, Oral, Daily               /71 (BP Location: Left arm, Patient Position: Lying)   Pulse 71   Temp 97.8 °F (36.6 °C) (Oral)   Resp 18   Ht 157.5 cm (62\")   Wt 78.2 kg (172 lb 6.4 oz)   SpO2 97%   BMI 31.53 kg/m²   Admit Weight  Weight: 78.2 kg (172 lb 6.4 oz)  [unfilled]    CBC:   Results from last 7 days   Lab Units 05/03/24  0649   WBC 10*3/mm3 5.09   HEMOGLOBIN g/dL 10.9*   HEMATOCRIT % 35.6   MCV fL 92.7   PLATELETS 10*3/mm3 143         BMP:  Results from last 7 days   Lab Units 05/04/24  0435 05/03/24  0649   POTASSIUM mmol/L 4.3 4.5   CHLORIDE mmol/L 103 103   CO2 mmol/L 25.0 29.0   BUN mg/dL 19 16   CREATININE mg/dL 0.76 0.88   GLUCOSE mg/dL 98 101*   CALCIUM mg/dL 8.9 9.5     PT/INR:     APTT:     MAG:     D Dimer:     Troponin I     ProBNP       PHYSICAL EXAM  General appearance: awake, alert, oriented, moves all extremities  Lungs: no rhonchi, no wheezes, no rales  Heart: Regular rate and rhythm, no murmurs rubs or gallops  Abdomen: positive bowel sounds, no bruits, no masses  Extremities: warm and dry, no cyanosis, no clubbing       "

## 2024-06-03 ENCOUNTER — TELEPHONE (OUTPATIENT)
Dept: CARDIOLOGY | Facility: CLINIC | Age: 79
End: 2024-06-03
Payer: MEDICARE

## 2024-06-03 NOTE — TELEPHONE ENCOUNTER
Name: Martha Rod    Relationship: Self    Best Callback Number: [phone]     Incoming call to the Hub, requesting to  Reschedule their HFU appointment on 06/24/24.     Per Hub workflow, further review is needed before the task can be completed.    Result of Call: Please reach out to the patient to reschedule    PATIENT WOULD LIKE THE SAME DAY, JUT SOMETHING LATER IN THE DAY.

## 2024-06-24 ENCOUNTER — OFFICE VISIT (OUTPATIENT)
Dept: CARDIOLOGY | Facility: CLINIC | Age: 79
End: 2024-06-24
Payer: MEDICARE

## 2024-06-24 VITALS
WEIGHT: 171.8 LBS | DIASTOLIC BLOOD PRESSURE: 62 MMHG | BODY MASS INDEX: 31.62 KG/M2 | OXYGEN SATURATION: 95 % | HEIGHT: 62 IN | HEART RATE: 79 BPM | SYSTOLIC BLOOD PRESSURE: 172 MMHG

## 2024-06-24 DIAGNOSIS — E78.5 DYSLIPIDEMIA: ICD-10-CM

## 2024-06-24 DIAGNOSIS — I10 HYPERTENSION, UNSPECIFIED TYPE: ICD-10-CM

## 2024-06-24 DIAGNOSIS — I25.10 CORONARY ARTERY DISEASE INVOLVING NATIVE CORONARY ARTERY OF NATIVE HEART WITHOUT ANGINA PECTORIS: Primary | ICD-10-CM

## 2024-06-24 PROCEDURE — 99214 OFFICE O/P EST MOD 30 MIN: CPT | Performed by: NURSE PRACTITIONER

## 2024-06-24 PROCEDURE — 1160F RVW MEDS BY RX/DR IN RCRD: CPT | Performed by: NURSE PRACTITIONER

## 2024-06-24 PROCEDURE — 3077F SYST BP >= 140 MM HG: CPT | Performed by: NURSE PRACTITIONER

## 2024-06-24 PROCEDURE — 3078F DIAST BP <80 MM HG: CPT | Performed by: NURSE PRACTITIONER

## 2024-06-24 PROCEDURE — 1159F MED LIST DOCD IN RCRD: CPT | Performed by: NURSE PRACTITIONER

## 2024-06-24 RX ORDER — BETAMETHASONE DIPROPIONATE 0.5 MG/G
CREAM TOPICAL
COMMUNITY
Start: 2024-04-29

## 2024-06-24 RX ORDER — FERROUS SULFATE 325(65) MG
325 TABLET ORAL DAILY
COMMUNITY
Start: 2024-06-06 | End: 2024-09-04

## 2024-06-24 RX ORDER — HYDROCODONE BITARTRATE AND ACETAMINOPHEN 5; 325 MG/1; MG/1
1 TABLET ORAL 3 TIMES DAILY
COMMUNITY
Start: 2024-06-05 | End: 2024-08-04

## 2024-06-24 NOTE — LETTER
June 24, 2024       No Recipients    Patient: Martha Rod   YOB: 1945   Date of Visit: 6/24/2024     Dear Nafisa Plascencia MD:       Thank you for referring Martha Rod to me for evaluation. Below are the relevant portions of my assessment and plan of care.    If you have questions, please do not hesitate to call me. I look forward to following Martha along with you.         Sincerely,        FRANKLIN Hughes        CC:   No Recipients    Lily Hernandez APRN  06/24/24 1009  Sign when Signing Visit  Subjective:     Encounter Date:06/24/2024    Primary Care Physician: Nafisa Plascencia MD      Patient ID: Martha Rod is a 78 y.o. female.    Chief Complaint:Chronic chest pain with high risk for CAD    PROBLEM LIST:  Coronary artery disease  5/2024 [facility] 85% proximal LAD (3.25 x 18 Xience MARLON).  40% mid LAD, 30% mid RCA.  EF 70%.  Hypertension  Dyslipidemia  Psoriasis  Surgeries:  Hysterectomy       No Known Allergies      Current Outpatient Medications:   •  ALPRAZolam (XANAX) 0.5 MG tablet, Take 1 tablet by mouth As Needed for Anxiety., Disp: , Rfl:   •  aspirin 81 MG chewable tablet, Chew 1 tablet Daily., Disp: , Rfl:   •  betamethasone, augmented, (DIPROLENE) 0.05 % cream, APPLY TO RASH TWICE A DAY AS DIRECTED BY YOUR PRESCRIBER, Disp: , Rfl:   •  carvedilol (COREG) 12.5 MG tablet, Take 1 tablet by mouth 2 (Two) Times a Day., Disp: 60 tablet, Rfl: 6  •  clopidogrel (PLAVIX) 75 MG tablet, Take 1 tablet by mouth Daily., Disp: 30 tablet, Rfl: 11  •  ferrous sulfate 325 (65 FE) MG tablet, Take 1 tablet by mouth Daily., Disp: , Rfl:   •  hydroCHLOROthiazide (MICROZIDE) 12.5 MG capsule, Take 1 capsule by mouth Daily., Disp: 90 capsule, Rfl: 3  •  HYDROcodone-acetaminophen (NORCO) 5-325 MG per tablet, Take 1 tablet by mouth 3 (Three) Times a Day., Disp: , Rfl:   •  levothyroxine (SYNTHROID, LEVOTHROID) 100 MCG tablet, Take 1 tablet by mouth Daily., Disp: , Rfl:   •  losartan (COZAAR) 100 MG  "tablet, Take 1 tablet by mouth Daily., Disp: , Rfl:   •  rosuvastatin (CRESTOR) 20 MG tablet, Take 1 tablet by mouth Daily., Disp: 90 tablet, Rfl: 4        History of Present Illness    Patient is a 78-year-old  female who presents today for follow-up status post cardiac catheterization with subsequent LAD stenting as noted in the problem list.  Since last being seen patient notes overall doing relatively well.  She is limited in physical activity secondary to back pain.  Notes that her chest discomfort is resolved.  Has some occasional left arm discomfort but may be related to back pain.  This is not really exertional related and happens at any time.  No reported syncope, presyncope.  Has been having some nausea which she thinks may be related to aspirin.  She is unclear if she is taking enteric-coated or not.  Patient reports that she has been attending cardiac rehab.  She did not take her medications this morning.  Systolic blood pressures have been around or less than 120 at rehab.    The following portions of the patient's history were reviewed and updated as appropriate: allergies, current medications, past family history, past medical history, past social history, past surgical history and problem list.      Social History     Tobacco Use   • Smoking status: Former     Current packs/day: 0.00     Types: Cigarettes     Quit date: 10/27/2001     Years since quittin.6     Passive exposure: Past   • Smokeless tobacco: Never   Vaping Use   • Vaping status: Never Used   Substance Use Topics   • Alcohol use: No   • Drug use: No         ROS       Objective:   /62 (BP Location: Right arm)   Pulse 79   Ht 157.5 cm (62.01\")   Wt 77.9 kg (171 lb 12.8 oz)   SpO2 95%   BMI 31.41 kg/m²         Vitals reviewed.   Constitutional:       Appearance: Well-developed and not in distress.      Comments: Overweight   Neck:      Vascular: No JVD.      Trachea: No tracheal deviation.   Pulmonary:      Effort: " Pulmonary effort is normal.      Breath sounds: Normal breath sounds.   Cardiovascular:      Normal rate. Regular rhythm.      Murmurs: There is no murmur.   Pulses:     Intact distal pulses.   Edema:     Peripheral edema absent.   Musculoskeletal:         General: No deformity. Skin:     General: Skin is warm and dry.   Neurological:      Mental Status: Alert and oriented to person, place, and time.         Procedures          Assessment:   Assessment & Plan     Diagnoses and all orders for this visit:    1. Coronary artery disease involving native coronary artery of native heart without angina pectoris (Primary), stable.  Status post recent LAD stenting.  On aspirin and Plavix.  No current angina.    2. Hypertension, unspecified type.  Elevated today.  However, patient notes that she has not taken her blood pressure medications.  Blood pressure is reportedly controlled at cardiac rehab monitoring.    3. Dyslipidemia, stable.  On statin.  Labs with primary care.      Plan:  Patient is overall stable from cardiac standpoint.  Encouraged patient to maintain aspirin for at least 6 months.  Instructed her to ensure that she is taking enteric-coated version.  Continue current cardiac regimen.  Reviewed cardiac catheterization results with patient and family in the office today.  Follow-up in 6 months time or sooner if needed.       Lily REID             Dictated utilizing Dragon dictation

## 2024-06-24 NOTE — PROGRESS NOTES
Subjective:     Encounter Date:06/24/2024    Primary Care Physician: Nafisa Plascencia MD      Patient ID: Martha Rod is a 78 y.o. female.    Chief Complaint:Chronic chest pain with high risk for CAD    PROBLEM LIST:  Coronary artery disease  5/2024 [facility] 85% proximal LAD (3.25 x 18 Xience MARLON).  40% mid LAD, 30% mid RCA.  EF 70%.  Hypertension  Dyslipidemia  Psoriasis  Surgeries:  Hysterectomy       No Known Allergies      Current Outpatient Medications:     ALPRAZolam (XANAX) 0.5 MG tablet, Take 1 tablet by mouth As Needed for Anxiety., Disp: , Rfl:     aspirin 81 MG chewable tablet, Chew 1 tablet Daily., Disp: , Rfl:     betamethasone, augmented, (DIPROLENE) 0.05 % cream, APPLY TO RASH TWICE A DAY AS DIRECTED BY YOUR PRESCRIBER, Disp: , Rfl:     carvedilol (COREG) 12.5 MG tablet, Take 1 tablet by mouth 2 (Two) Times a Day., Disp: 60 tablet, Rfl: 6    clopidogrel (PLAVIX) 75 MG tablet, Take 1 tablet by mouth Daily., Disp: 30 tablet, Rfl: 11    ferrous sulfate 325 (65 FE) MG tablet, Take 1 tablet by mouth Daily., Disp: , Rfl:     hydroCHLOROthiazide (MICROZIDE) 12.5 MG capsule, Take 1 capsule by mouth Daily., Disp: 90 capsule, Rfl: 3    HYDROcodone-acetaminophen (NORCO) 5-325 MG per tablet, Take 1 tablet by mouth 3 (Three) Times a Day., Disp: , Rfl:     levothyroxine (SYNTHROID, LEVOTHROID) 100 MCG tablet, Take 1 tablet by mouth Daily., Disp: , Rfl:     losartan (COZAAR) 100 MG tablet, Take 1 tablet by mouth Daily., Disp: , Rfl:     rosuvastatin (CRESTOR) 20 MG tablet, Take 1 tablet by mouth Daily., Disp: 90 tablet, Rfl: 4        History of Present Illness    Patient is a 78-year-old  female who presents today for follow-up status post cardiac catheterization with subsequent LAD stenting as noted in the problem list.  Since last being seen patient notes overall doing relatively well.  She is limited in physical activity secondary to back pain.  Notes that her chest discomfort is resolved.  Has some  "occasional left arm discomfort but may be related to back pain.  This is not really exertional related and happens at any time.  No reported syncope, presyncope.  Has been having some nausea which she thinks may be related to aspirin.  She is unclear if she is taking enteric-coated or not.  Patient reports that she has been attending cardiac rehab.  She did not take her medications this morning.  Systolic blood pressures have been around or less than 120 at rehab.    The following portions of the patient's history were reviewed and updated as appropriate: allergies, current medications, past family history, past medical history, past social history, past surgical history and problem list.      Social History     Tobacco Use    Smoking status: Former     Current packs/day: 0.00     Types: Cigarettes     Quit date: 10/27/2001     Years since quittin.6     Passive exposure: Past    Smokeless tobacco: Never   Vaping Use    Vaping status: Never Used   Substance Use Topics    Alcohol use: No    Drug use: No         ROS       Objective:   /62 (BP Location: Right arm)   Pulse 79   Ht 157.5 cm (62.01\")   Wt 77.9 kg (171 lb 12.8 oz)   SpO2 95%   BMI 31.41 kg/m²         Vitals reviewed.   Constitutional:       Appearance: Well-developed and not in distress.      Comments: Overweight   Neck:      Vascular: No JVD.      Trachea: No tracheal deviation.   Pulmonary:      Effort: Pulmonary effort is normal.      Breath sounds: Normal breath sounds.   Cardiovascular:      Normal rate. Regular rhythm.      Murmurs: There is no murmur.   Pulses:     Intact distal pulses.   Edema:     Peripheral edema absent.   Musculoskeletal:         General: No deformity. Skin:     General: Skin is warm and dry.   Neurological:      Mental Status: Alert and oriented to person, place, and time.         Procedures          Assessment:   Assessment & Plan      Diagnoses and all orders for this visit:    1. Coronary artery disease " involving native coronary artery of native heart without angina pectoris (Primary), stable.  Status post recent LAD stenting.  On aspirin and Plavix.  No current angina.    2. Hypertension, unspecified type.  Elevated today.  However, patient notes that she has not taken her blood pressure medications.  Blood pressure is reportedly controlled at cardiac rehab monitoring.    3. Dyslipidemia, stable.  On statin.  Labs with primary care.      Plan:  Patient is overall stable from cardiac standpoint.  Encouraged patient to maintain aspirin for at least 6 months.  Instructed her to ensure that she is taking enteric-coated version.  Continue current cardiac regimen.  Reviewed cardiac catheterization results with patient and family in the office today.  Follow-up in 6 months time or sooner if needed.       Lily REID             Dictated utilizing Dragon dictation

## 2025-01-13 PROBLEM — E78.5 DYSLIPIDEMIA: Status: ACTIVE | Noted: 2025-01-13

## 2025-01-13 NOTE — PROGRESS NOTES
[facility] Cardiology  Subjective:     Encounter Date: 01/14/2025      Patient ID: Martha Rod is a 79 y.o. female.    Chief Complaint: Coronary artery disease involving native coronary artery of      PROBLEM LIST:  Coronary artery disease  5/2024 [facility] 85% proximal LAD (3.25 x 18 Xience MARLON).  40% mid LAD, 30% mid RCA.  EF 70%.  Hypertension  Dyslipidemia  Psoriasis  Surgeries:  Hysterectomy     History of Present Illness  Martha Rod returns today for a follow up with a history of CAD and cardiac risk factors. Since last visit, patient notes to be doing well from cardiac standpoint. Occasionally has some left sided chest pain but this is not similar to her previous angina. Notes that at times at night she will note being able to hear her heart beat. No symptoms otherwise.      No Known Allergies      Current Outpatient Medications:     aspirin 81 MG chewable tablet, Chew 1 tablet Daily., Disp: , Rfl:     betamethasone, augmented, (DIPROLENE) 0.05 % cream, APPLY TO RASH TWICE A DAY AS DIRECTED BY YOUR PRESCRIBER, Disp: , Rfl:     carvedilol (COREG) 12.5 MG tablet, Take 1 tablet by mouth 2 (Two) Times a Day., Disp: 60 tablet, Rfl: 6    clopidogrel (PLAVIX) 75 MG tablet, Take 1 tablet by mouth Daily., Disp: 30 tablet, Rfl: 11    hydroCHLOROthiazide (MICROZIDE) 12.5 MG capsule, Take 1 capsule by mouth Daily., Disp: 90 capsule, Rfl: 3    levothyroxine (SYNTHROID, LEVOTHROID) 100 MCG tablet, Take 1 tablet by mouth Daily., Disp: , Rfl:     losartan (COZAAR) 100 MG tablet, Take 1 tablet by mouth Daily., Disp: , Rfl:     rosuvastatin (CRESTOR) 20 MG tablet, Take 1 tablet by mouth Daily., Disp: 90 tablet, Rfl: 4    The following portions of the patient's history were reviewed and updated as appropriate: allergies, current medications, past family history, past medical history, past social history, past surgical history and problem list.    ROS       Objective:     Vitals:    01/14/25 1250  "  BP: 160/80   Pulse: 88   SpO2: 95%   Weight: 77.5 kg (170 lb 12.8 oz)   Height: 157.5 cm (62\")         Vitals reviewed.   Constitutional:       Appearance: Well-developed and not in distress.      Comments: overweight   Neck:      Vascular: No JVD.      Trachea: No tracheal deviation.   Pulmonary:      Effort: Pulmonary effort is normal.      Breath sounds: Normal breath sounds.   Cardiovascular:      Normal rate. Regular rhythm.      Murmurs: There is no murmur.   Edema:     Peripheral edema absent.   Musculoskeletal:         General: No deformity. Skin:     General: Skin is warm and dry.   Neurological:      Mental Status: Alert and oriented to person, place, and time.         Lab Review:  Lab Results   Component Value Date    GLUCOSE 98 05/04/2024    BUN 19 05/04/2024    CREATININE 0.76 05/04/2024    BCR 25.0 05/04/2024    K 4.3 05/04/2024    CO2 25.0 05/04/2024    CALCIUM 8.9 05/04/2024    ALBUMIN 4.3 05/03/2024    ALKPHOS 72 05/03/2024    AST 11 05/03/2024    ALT 7 05/03/2024     Lab Results   Component Value Date    WBC 5.09 05/03/2024    RBC 3.84 05/03/2024    HGB 10.9 (L) 05/03/2024    HCT 35.6 05/03/2024    MCV 92.7 05/03/2024     05/03/2024     Lab Results   Component Value Date    HGBA1C 4.60 (L) 05/03/2024      Lab date: 03/05/2024  FLP: , TG 89, HDL 55.30, LDL 92.9        ECG 12 Lead    Date/Time: 1/14/2025 1:33 PM  Performed by: David Palma MD    Authorized by: David Palma MD  Comparison: compared with previous ECG from 4/23/2024  Similar to previous ECG  Rhythm: sinus rhythm  Other findings: non-specific ST-T wave changes            Advance Care Planning   ACP discussion was held with the patient during this visit. Patient does not have an advance directive, declines further assistance.           Assessment:   Diagnoses and all orders for this visit:    1. Coronary artery disease involving native coronary artery of native heart without angina pectoris (Primary)    2. " Hypertension, unspecified type    3. Dyslipidemia        Impression:  1. Coronary artery disease. Stable, overall currently atypical symptoms. No current concern for recurrent angina. On ASA and Plavix.     2. Hypertension. Elevated. On ARB, BB, and HCTZ.    3. Dyslipidemia. On statin.     Plan:  Stable cardiac status.   Increase HCTZ to 25 mg daily.   May discontinue Plavix.   Continue other current medications.  Revisit in 12 MO, or sooner as needed.          FRANKLIN Hughes scribed this dictation for Dr. David Palma.   I have seen and examined the patient, I have reviewed the note, discussed the case with the [place] practice clinician, made necessary changes and I agree with the final note.    David Palma MD  01/14/25  15:51 EST      Please note that portions of this note may have been completed with a voice recognition program. Efforts were made to edit the dictations, but occasionally words are mistranscribed.

## 2025-01-14 ENCOUNTER — OFFICE VISIT (OUTPATIENT)
Dept: CARDIOLOGY | Facility: CLINIC | Age: 80
End: 2025-01-14
Payer: MEDICARE

## 2025-01-14 VITALS
HEART RATE: 88 BPM | HEIGHT: 62 IN | BODY MASS INDEX: 31.43 KG/M2 | SYSTOLIC BLOOD PRESSURE: 160 MMHG | OXYGEN SATURATION: 95 % | DIASTOLIC BLOOD PRESSURE: 80 MMHG | WEIGHT: 170.8 LBS

## 2025-01-14 DIAGNOSIS — E78.5 DYSLIPIDEMIA: ICD-10-CM

## 2025-01-14 DIAGNOSIS — I10 HYPERTENSION, UNSPECIFIED TYPE: ICD-10-CM

## 2025-01-14 DIAGNOSIS — I25.10 CORONARY ARTERY DISEASE INVOLVING NATIVE CORONARY ARTERY OF NATIVE HEART WITHOUT ANGINA PECTORIS: Primary | ICD-10-CM

## 2025-01-14 PROCEDURE — 1160F RVW MEDS BY RX/DR IN RCRD: CPT | Performed by: INTERNAL MEDICINE

## 2025-01-14 PROCEDURE — 1159F MED LIST DOCD IN RCRD: CPT | Performed by: INTERNAL MEDICINE

## 2025-01-14 PROCEDURE — 3079F DIAST BP 80-89 MM HG: CPT | Performed by: INTERNAL MEDICINE

## 2025-01-14 PROCEDURE — 99214 OFFICE O/P EST MOD 30 MIN: CPT | Performed by: INTERNAL MEDICINE

## 2025-01-14 PROCEDURE — 93000 ELECTROCARDIOGRAM COMPLETE: CPT | Performed by: INTERNAL MEDICINE

## 2025-01-14 PROCEDURE — 3077F SYST BP >= 140 MM HG: CPT | Performed by: INTERNAL MEDICINE

## 2025-01-14 RX ORDER — HYDROCHLOROTHIAZIDE 25 MG/1
25 TABLET ORAL DAILY
Qty: 30 TABLET | Refills: 11 | Status: SHIPPED | OUTPATIENT
Start: 2025-01-14

## (undated) DEVICE — MODEL AT P65, P/N 701554-001KIT CONTENTS: HAND CONTROLLER, 3-WAY HIGH-PRESSURE STOPCOCK WITH ROTATING END AND PREMIUM HIGH-PRESSURE TUBING: Brand: ANGIOTOUCH® KIT

## (undated) DEVICE — DEV INFL MONARCH 25W

## (undated) DEVICE — TR BAND RADIAL ARTERY COMPRESSION DEVICE: Brand: TR BAND

## (undated) DEVICE — CATH DIAG EXPO M/ PK 6FR FL4/FR4 PIG 3PK

## (undated) DEVICE — KT VLV HEMO MAP ACC PLS LG/BORE MTL/INTRO W/TORQ/DEV

## (undated) DEVICE — GLIDESHEATH BASIC HYDROPHILIC COATED INTRODUCER SHEATH: Brand: GLIDESHEATH

## (undated) DEVICE — TREK CORONARY DILATATION CATHETER 2.75 MM X 15 MM / RAPID-EXCHANGE: Brand: TREK

## (undated) DEVICE — PK CATH CARD 10

## (undated) DEVICE — MODEL BT2000 P/N 700287-012KIT CONTENTS: MANIFOLD WITH SALINE AND CONTRAST PORTS, SALINE TUBING WITH SPIKE AND HAND SYRINGE, TRANSDUCER: Brand: BT2000 AUTOMATED MANIFOLD KIT

## (undated) DEVICE — CATH DIAG EXPO .056 FL3.5 6F 100CM

## (undated) DEVICE — ADULT, W/LG. BACK PAD, RADIOTRANSPARENT ELEMENT AND LEAD WIRE COMPATIBLE W/: Brand: DEFIBRILLATION ELECTRODES

## (undated) DEVICE — NC TREK NEO™ CORONARY DILATATION CATHETER 3.50 MM X 15 MM / RAPID-EXCHANGE: Brand: NC TREK NEO™

## (undated) DEVICE — GW PRESSUREWIRE X WIRELESS FFR 175CM

## (undated) DEVICE — GUIDE CATHETER: Brand: MACH1™

## (undated) DEVICE — GW PERIPH GUIDERIGHT STD/EXCHNG/J/TIP SS 0.035IN 5X260CM

## (undated) DEVICE — COPILOT BLEEDBACK CONTROL VALVE: Brand: COPILOT